# Patient Record
Sex: FEMALE | Race: WHITE | NOT HISPANIC OR LATINO
[De-identification: names, ages, dates, MRNs, and addresses within clinical notes are randomized per-mention and may not be internally consistent; named-entity substitution may affect disease eponyms.]

---
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
## 2023-03-28 NOTE — ED PEDIATRIC NURSE NOTE - TEMPLATE
O-L Flap Text: The defect edges were debeveled with a #15 scalpel blade.  Given the location of the defect, shape of the defect and the proximity to free margins an O-L flap was deemed most appropriate.  Using a sterile surgical marker, an appropriate advancement flap was drawn incorporating the defect and placing the expected incisions within the relaxed skin tension lines where possible.    The area thus outlined was incised deep to adipose tissue with a #15 scalpel blade.  The skin margins were undermined to an appropriate distance in all directions utilizing iris scissors. Psych/Behavioral

## 2023-05-18 NOTE — ED PEDIATRIC NURSE NOTE - CARDIO ASSESSMENT
I reviewed the H&P, I examined the patient, and there are no changes in the patient's condition.    
---

## 2025-01-31 NOTE — ED POST DISCHARGE NOTE - DETAILS
Detailed message with call back number left for parents Nutrition physical assessment not warranted - No overt visual signs of lean body muscle depletion or subcutaneous fat loss

## 2025-06-09 NOTE — ED PEDIATRIC NURSE REASSESSMENT NOTE - ED CARDIAC RATE
What Type Of Note Output Would You Prefer (Optional)?: Bullet Format Hpi Title: Evaluation of Skin Lesions How Severe Are Your Spot(S)?: mild Have Your Spot(S) Been Treated In The Past?: has been treated Additional History: —\\n\\nEst pt, last seen in Feb by BG, here for upper body skin exam.\\nPersonal hx (see above).\\nSpots of concern: forehead, left ear, chest. normal